# Patient Record
Sex: MALE | Race: WHITE | Employment: OTHER | URBAN - METROPOLITAN AREA
[De-identification: names, ages, dates, MRNs, and addresses within clinical notes are randomized per-mention and may not be internally consistent; named-entity substitution may affect disease eponyms.]

---

## 2017-03-29 ENCOUNTER — OP HISTORICAL/CONVERTED ENCOUNTER (OUTPATIENT)
Dept: OTHER | Age: 72
End: 2017-03-29

## 2017-08-03 ENCOUNTER — OP HISTORICAL/CONVERTED ENCOUNTER (OUTPATIENT)
Dept: OTHER | Age: 72
End: 2017-08-03

## 2017-08-22 ENCOUNTER — OP HISTORICAL/CONVERTED ENCOUNTER (OUTPATIENT)
Dept: OTHER | Age: 72
End: 2017-08-22

## 2017-09-07 ENCOUNTER — OP HISTORICAL/CONVERTED ENCOUNTER (OUTPATIENT)
Dept: OTHER | Age: 72
End: 2017-09-07

## 2017-09-18 ENCOUNTER — OP HISTORICAL/CONVERTED ENCOUNTER (OUTPATIENT)
Dept: OTHER | Age: 72
End: 2017-09-18

## 2018-01-17 ENCOUNTER — OP HISTORICAL/CONVERTED ENCOUNTER (OUTPATIENT)
Dept: OTHER | Age: 73
End: 2018-01-17

## 2018-03-15 ENCOUNTER — OP HISTORICAL/CONVERTED ENCOUNTER (OUTPATIENT)
Dept: OTHER | Age: 73
End: 2018-03-15

## 2018-04-09 ENCOUNTER — OP HISTORICAL/CONVERTED ENCOUNTER (OUTPATIENT)
Dept: OTHER | Age: 73
End: 2018-04-09

## 2018-08-02 ENCOUNTER — OP HISTORICAL/CONVERTED ENCOUNTER (OUTPATIENT)
Dept: OTHER | Age: 73
End: 2018-08-02

## 2018-08-10 ENCOUNTER — OP HISTORICAL/CONVERTED ENCOUNTER (OUTPATIENT)
Dept: OTHER | Age: 73
End: 2018-08-10

## 2018-10-05 ENCOUNTER — OP HISTORICAL/CONVERTED ENCOUNTER (OUTPATIENT)
Dept: OTHER | Age: 73
End: 2018-10-05

## 2019-01-22 ENCOUNTER — OP HISTORICAL/CONVERTED ENCOUNTER (OUTPATIENT)
Dept: OTHER | Age: 74
End: 2019-01-22

## 2019-08-06 ENCOUNTER — ED HISTORICAL/CONVERTED ENCOUNTER (OUTPATIENT)
Dept: OTHER | Age: 74
End: 2019-08-06

## 2019-08-28 ENCOUNTER — OP HISTORICAL/CONVERTED ENCOUNTER (OUTPATIENT)
Dept: OTHER | Age: 74
End: 2019-08-28

## 2019-12-30 ENCOUNTER — OP HISTORICAL/CONVERTED ENCOUNTER (OUTPATIENT)
Dept: OTHER | Age: 74
End: 2019-12-30

## 2020-01-14 ENCOUNTER — OP HISTORICAL/CONVERTED ENCOUNTER (OUTPATIENT)
Dept: OTHER | Age: 75
End: 2020-01-14

## 2020-06-04 ENCOUNTER — OP HISTORICAL/CONVERTED ENCOUNTER (OUTPATIENT)
Dept: OTHER | Age: 75
End: 2020-06-04

## 2020-09-29 ENCOUNTER — OFFICE VISIT (OUTPATIENT)
Dept: PODIATRY | Age: 75
End: 2020-09-29
Payer: MEDICARE

## 2020-09-29 VITALS
HEART RATE: 70 BPM | BODY MASS INDEX: 21.49 KG/M2 | WEIGHT: 141.8 LBS | OXYGEN SATURATION: 96 % | HEIGHT: 68 IN | DIASTOLIC BLOOD PRESSURE: 80 MMHG | SYSTOLIC BLOOD PRESSURE: 136 MMHG | TEMPERATURE: 98.4 F

## 2020-09-29 DIAGNOSIS — L84 CORN OF TOE: Primary | ICD-10-CM

## 2020-09-29 PROCEDURE — G8536 NO DOC ELDER MAL SCRN: HCPCS | Performed by: PODIATRIST

## 2020-09-29 PROCEDURE — G8427 DOCREV CUR MEDS BY ELIG CLIN: HCPCS | Performed by: PODIATRIST

## 2020-09-29 PROCEDURE — G8420 CALC BMI NORM PARAMETERS: HCPCS | Performed by: PODIATRIST

## 2020-09-29 PROCEDURE — 3017F COLORECTAL CA SCREEN DOC REV: CPT | Performed by: PODIATRIST

## 2020-09-29 PROCEDURE — 1101F PT FALLS ASSESS-DOCD LE1/YR: CPT | Performed by: PODIATRIST

## 2020-09-29 PROCEDURE — 99213 OFFICE O/P EST LOW 20 MIN: CPT | Performed by: PODIATRIST

## 2020-09-29 PROCEDURE — G8432 DEP SCR NOT DOC, RNG: HCPCS | Performed by: PODIATRIST

## 2020-09-29 RX ORDER — RAMIPRIL 10 MG/1
10 CAPSULE ORAL DAILY
COMMUNITY

## 2020-09-29 RX ORDER — PANTOPRAZOLE SODIUM 40 MG/1
40 TABLET, DELAYED RELEASE ORAL DAILY
COMMUNITY

## 2020-09-29 RX ORDER — AMLODIPINE BESYLATE 5 MG/1
5 TABLET ORAL DAILY
COMMUNITY

## 2020-09-29 RX ORDER — CLOPIDOGREL BISULFATE 75 MG/1
TABLET ORAL
COMMUNITY
End: 2022-03-16 | Stop reason: ALTCHOICE

## 2020-09-29 RX ORDER — FLUTICASONE FUROATE, UMECLIDINIUM BROMIDE AND VILANTEROL TRIFENATATE 100; 62.5; 25 UG/1; UG/1; UG/1
1 POWDER RESPIRATORY (INHALATION) DAILY
COMMUNITY

## 2020-09-29 RX ORDER — TAMSULOSIN HYDROCHLORIDE 0.4 MG/1
0.4 CAPSULE ORAL DAILY
COMMUNITY
End: 2021-03-16 | Stop reason: SDUPTHER

## 2020-09-29 RX ORDER — ATORVASTATIN CALCIUM 10 MG/1
TABLET, FILM COATED ORAL DAILY
COMMUNITY

## 2020-09-29 RX ORDER — ASPIRIN 81 MG/1
TABLET ORAL DAILY
COMMUNITY

## 2020-09-30 PROBLEM — L84 CORN OF TOE: Status: ACTIVE | Noted: 2020-09-30

## 2020-09-30 NOTE — PROGRESS NOTES
Podiatry History and Physical    Subjective:         Patient is a 77 y.o. male who is being seen as a returning patient complaining of pain to his right fifth toe. Patient states roughly 2 years prior he underwent surgical procedure there and now he is having a callus/corn buildup to the area. He denies any trauma to the area. He states the pain rest level of 2 out of 10. He states the pain is worse with his close toed shoes and ambulating. He denies any local/systemic signs infection. He denies any other pedal complaints    Past Medical History:   Diagnosis Date    Hypercholesterolemia     Hypertension     Stroke ([address]     mini-stroke     Past Surgical History:   Procedure Laterality Date    HX CORONARY STENT PLACEMENT         Family History   Problem Relation Age of Onset    No Known Problems Mother     No Known Problems Father       Social History     Tobacco Use    Smoking status: Current Some Day Smoker     Packs/day: 0.50     Years: 50.00     Pack years: 25.00    Smokeless tobacco: Never Used   Substance Use Topics    Alcohol use: Not on file     No Known Allergies  Prior to Admission medications    Medication Sig Start Date End Date Taking? Authorizing Provider   aspirin delayed-release 81 mg tablet Take  by mouth daily. Yes Provider, Historical   fluticasone-umeclidinium-vilanterol (Trelegy Ellipta) 100-62.5-25 mcg inhaler Take 1 Puff by inhalation daily. Yes Provider, Historical   pantoprazole (PROTONIX) 40 mg tablet Take 40 mg by mouth daily. Yes Provider, Historical   ramipriL (ALTACE) 10 mg capsule Take 10 mg by mouth daily. Yes Provider, Historical   amLODIPine (NORVASC) 5 mg tablet Take 5 mg by mouth daily. Yes Provider, Historical   tamsulosin (FLOMAX) 0.4 mg capsule Take 0.4 mg by mouth daily. Yes Provider, Historical   clopidogreL (Plavix) 75 mg tab Take  by mouth. Yes Provider, Historical   atorvastatin (LIPITOR) 10 mg tablet Take  by mouth daily.    Yes Provider, Historical       Review of Systems   Constitutional: Negative. Eyes: Negative. Respiratory: Negative. Endocrine: Negative. Genitourinary: Positive for frequency. Musculoskeletal: Negative. Allergic/Immunologic: Negative. Neurological: Negative. Hematological: Bruises/bleeds easily. Psychiatric/Behavioral: Negative. All other systems reviewed and are negative. Objective:     Visit Vitals  /80 (BP 1 Location: Left arm, BP Patient Position: Sitting)   Pulse 70   Temp 98.4 °F (36.9 °C) (Temporal)   Ht 5' 8\" (1.727 m)   Wt 141 lb 12.8 oz (64.3 kg)   SpO2 96%   BMI 21.56 kg/m²       Physical Exam  Vitals signs reviewed. Constitutional:       Appearance: He is normal weight. HENT:      Mouth/Throat:      Dentition: Normal dentition. Cardiovascular:      Pulses:           Dorsalis pedis pulses are 2+ on the right side and 2+ on the left side. Posterior tibial pulses are 2+ on the right side and 2+ on the left side. Pulmonary:      Effort: Pulmonary effort is normal.   Musculoskeletal:      Right lower leg: No edema. Left lower leg: No edema. Right foot: Normal range of motion. No deformity or bunion. Left foot: Normal range of motion. No deformity or bunion. Feet:      Right foot:      Protective Sensation: 10 sites tested. 10 sites sensed. Skin integrity: Callus present. Toenail Condition: Right toenails are normal.      Left foot:      Protective Sensation: 10 sites tested. 10 sites sensed. Skin integrity: Skin integrity normal.      Toenail Condition: Left toenails are normal.   Skin:     General: Skin is warm. Capillary Refill: Capillary refill takes 2 to 3 seconds. Neurological:      Mental Status: He is alert and oriented to person, place, and time. Psychiatric:         Mood and Affect: Mood and affect normal.         Behavior: Behavior is cooperative.          Data Review: No results found for this or any previous visit (from the past 24 hour(s)). Impression:       ICD-10-CM ICD-9-CM    1. Corn of toe  L84 700          Recommendation:     Patient seen and evaluated in the office  Discussed and educated patient regarding his current medical condition  Instructed patient to limit pressure and shear force to the area to prevent callus formation.   Symptoms return or persist, will interrogate the area with x-ray        RTC in

## 2020-11-02 ENCOUNTER — HOSPITAL ENCOUNTER (OUTPATIENT)
Dept: PREADMISSION TESTING | Age: 75
Discharge: HOME | End: 2020-11-02
Payer: MEDICARE

## 2020-11-02 PROCEDURE — 87635 SARS-COV-2 COVID-19 AMP PRB: CPT

## 2020-11-05 LAB — SARS-COV-2, COV2NT: NOT DETECTED

## 2020-11-06 ENCOUNTER — ANESTHESIA (OUTPATIENT)
Dept: ENDOSCOPY | Age: 75
End: 2020-11-06
Payer: MEDICARE

## 2020-11-06 ENCOUNTER — HOSPITAL ENCOUNTER (OUTPATIENT)
Age: 75
Setting detail: OUTPATIENT SURGERY
Discharge: HOME | End: 2020-11-06
Attending: INTERNAL MEDICINE | Admitting: INTERNAL MEDICINE
Payer: MEDICARE

## 2020-11-06 ENCOUNTER — ANESTHESIA EVENT (OUTPATIENT)
Dept: ENDOSCOPY | Age: 75
End: 2020-11-06
Payer: MEDICARE

## 2020-11-06 ENCOUNTER — APPOINTMENT (OUTPATIENT)
Dept: ENDOSCOPY | Age: 75
End: 2020-11-06
Attending: INTERNAL MEDICINE
Payer: MEDICARE

## 2020-11-06 VITALS
DIASTOLIC BLOOD PRESSURE: 69 MMHG | HEIGHT: 68 IN | WEIGHT: 143 LBS | SYSTOLIC BLOOD PRESSURE: 122 MMHG | TEMPERATURE: 97.6 F | BODY MASS INDEX: 21.67 KG/M2 | RESPIRATION RATE: 18 BRPM | OXYGEN SATURATION: 98 % | HEART RATE: 71 BPM

## 2020-11-06 PROCEDURE — 76040000007: Performed by: INTERNAL MEDICINE

## 2020-11-06 PROCEDURE — 77030019988 HC FCPS ENDOSC DISP BSC -B: Performed by: INTERNAL MEDICINE

## 2020-11-06 PROCEDURE — 2709999900 HC NON-CHARGEABLE SUPPLY: Performed by: INTERNAL MEDICINE

## 2020-11-06 PROCEDURE — 74011000250 HC RX REV CODE- 250: Performed by: NURSE ANESTHETIST, CERTIFIED REGISTERED

## 2020-11-06 PROCEDURE — 88305 TISSUE EXAM BY PATHOLOGIST: CPT

## 2020-11-06 PROCEDURE — 74011250636 HC RX REV CODE- 250/636: Performed by: NURSE ANESTHETIST, CERTIFIED REGISTERED

## 2020-11-06 PROCEDURE — 76060000032 HC ANESTHESIA 0.5 TO 1 HR: Performed by: INTERNAL MEDICINE

## 2020-11-06 PROCEDURE — 74011250636 HC RX REV CODE- 250/636: Performed by: INTERNAL MEDICINE

## 2020-11-06 RX ORDER — PROPOFOL 10 MG/ML
INJECTION, EMULSION INTRAVENOUS AS NEEDED
Status: DISCONTINUED | OUTPATIENT
Start: 2020-11-06 | End: 2020-11-06 | Stop reason: HOSPADM

## 2020-11-06 RX ORDER — LIDOCAINE HYDROCHLORIDE 20 MG/ML
INJECTION, SOLUTION EPIDURAL; INFILTRATION; INTRACAUDAL; PERINEURAL AS NEEDED
Status: DISCONTINUED | OUTPATIENT
Start: 2020-11-06 | End: 2020-11-06 | Stop reason: HOSPADM

## 2020-11-06 RX ORDER — SODIUM CHLORIDE, SODIUM LACTATE, POTASSIUM CHLORIDE, CALCIUM CHLORIDE 600; 310; 30; 20 MG/100ML; MG/100ML; MG/100ML; MG/100ML
50 INJECTION, SOLUTION INTRAVENOUS CONTINUOUS
Status: DISCONTINUED | OUTPATIENT
Start: 2020-11-06 | End: 2020-11-06 | Stop reason: HOSPADM

## 2020-11-06 RX ADMIN — LIDOCAINE HYDROCHLORIDE 50 MG: 20 INJECTION, SOLUTION EPIDURAL; INFILTRATION; INTRACAUDAL; PERINEURAL at 09:50

## 2020-11-06 RX ADMIN — SODIUM CHLORIDE, POTASSIUM CHLORIDE, SODIUM LACTATE AND CALCIUM CHLORIDE 50 ML/HR: 600; 310; 30; 20 INJECTION, SOLUTION INTRAVENOUS at 08:25

## 2020-11-06 RX ADMIN — LIDOCAINE HYDROCHLORIDE 100 MG: 20 INJECTION, SOLUTION EPIDURAL; INFILTRATION; INTRACAUDAL; PERINEURAL at 09:44

## 2020-11-06 RX ADMIN — PROPOFOL 100 MG: 10 INJECTION, EMULSION INTRAVENOUS at 09:44

## 2020-11-06 RX ADMIN — LIDOCAINE HYDROCHLORIDE 50 MG: 20 INJECTION, SOLUTION EPIDURAL; INFILTRATION; INTRACAUDAL; PERINEURAL at 09:47

## 2020-11-06 NOTE — ANESTHESIA POSTPROCEDURE EVALUATION
Procedure(s):  ESOPHAGOGASTRODUODENOSCOPY (EGD).     total IV anesthesia, general    Anesthesia Post Evaluation      Multimodal analgesia: multimodal analgesia not used between 6 hours prior to anesthesia start to PACU discharge  Patient participation: waiting for patient participation  Level of consciousness: sleepy but conscious  Pain management: adequate  Airway patency: patent  Anesthetic complications: no  Cardiovascular status: stable  Respiratory status: spontaneous ventilation  Hydration status: stable  Post anesthesia nausea and vomiting:  none  Final Post Anesthesia Temperature Assessment:  Normothermia (36.0-37.5 degrees C)      INITIAL Post-op Vital signs:   Vitals Value Taken Time   /62 11/6/2020  9:51 AM   Temp 36.5 °C (97.7 °F) 11/6/2020  9:51 AM   Pulse 83 11/6/2020  9:51 AM   Resp 10 11/6/2020  9:51 AM   SpO2 96 % 11/6/2020  9:51 AM

## 2020-11-06 NOTE — PERIOP NOTES
Patient alert and oriented x 4 with respirations even and unlabored on RA. Patient discharged per physician's order. Patient verbalizes understanding of discharge instructions. Patient transported via wheelchair to front hospital entrance with step-son present to transport patient via private vehicle.

## 2020-11-06 NOTE — ANESTHESIA PREPROCEDURE EVALUATION
Relevant Problems   No relevant active problems       Anesthetic History   No history of anesthetic complications            Review of Systems / Medical History  Patient summary reviewed, nursing notes reviewed and pertinent labs reviewed    Pulmonary    COPD: moderate      Shortness of breath         Neuro/Psych         TIA     Cardiovascular    Hypertension          CAD, cardiac stents and hyperlipidemia         GI/Hepatic/Renal     GERD (Jey's Esophagus): poorly controlled           Endo/Other  Within defined limits           Other Findings   Comments: Allergies  No Known Allergies  Ht: 5' 8\" (172.7 cm)  Weight: 64.9 kg (143 lb)  BMI: 21.74 kg/m²  Procedure  ESOPHAGOGASTRODUODENOSCOPY (EGD)    Medical History  Hypertension  Hypercholesterolemia  Stroke [facility]         Physical Exam    Airway  Mallampati: II  TM Distance: 4 - 6 cm  Neck ROM: normal range of motion   Mouth opening: Normal     Cardiovascular    Rhythm: regular  Rate: normal         Dental  No notable dental hx       Pulmonary  Breath sounds clear to auscultation               Abdominal  GI exam deferred       Other Findings            Anesthetic Plan    ASA: 3  Anesthesia type: total IV anesthesia and general          Induction: Intravenous  Anesthetic plan and risks discussed with: Patient      General anesthesia was prescribed for this patient because by definition it is \"a drug-induced loss of consciousness during which patients are not arousable, even by painful stimulation. \" Sometimes, the ability to independently maintain ventilatory function is often impaired and patients often require assistance in maintaining a patent airway. Occasionally, positive pressure ventilation may be required because of depressed spontaneous ventilation or drug-induced depression of neuromuscular function. This depth of anesthesia is preferred for endoscopic procedures to facilitate the procedure and for patient safety/quality of care.

## 2020-12-21 ENCOUNTER — HOSPITAL ENCOUNTER (OUTPATIENT)
Dept: CT IMAGING | Age: 75
Discharge: HOME | End: 2020-12-21
Attending: INTERNAL MEDICINE
Payer: MEDICARE

## 2020-12-21 DIAGNOSIS — J44.9 COPD (CHRONIC OBSTRUCTIVE PULMONARY DISEASE) (HCC): ICD-10-CM

## 2020-12-21 PROCEDURE — 71250 CT THORAX DX C-: CPT

## 2021-02-15 ENCOUNTER — TELEPHONE (OUTPATIENT)
Dept: UROLOGY | Age: 76
End: 2021-02-15

## 2021-02-15 NOTE — TELEPHONE ENCOUNTER
Patient states that he needs a refill on tamsulosin and sent to [facility] in [place].  I'm not sure if he has seen Dr. Stella Vasquez in a while I couldn't see it in his chart

## 2021-02-15 NOTE — TELEPHONE ENCOUNTER
PT has not been seen since 10/04/2019. Needs to make an appt to get refill. Let me know so I can get enough medication until he comes in.

## 2021-02-16 ENCOUNTER — TELEPHONE (OUTPATIENT)
Dept: UROLOGY | Age: 76
End: 2021-02-16

## 2021-02-16 RX ORDER — TAMSULOSIN HYDROCHLORIDE 0.4 MG/1
0.4 CAPSULE ORAL DAILY
Qty: 30 CAP | Refills: 1 | Status: SHIPPED | OUTPATIENT
Start: 2021-02-16 | End: 2021-03-18

## 2021-02-16 NOTE — TELEPHONE ENCOUNTER
Spoke with patient and let him know that Dr. Stern Scale sent over his refill for one month with 1 refill and that he needed to get a PSA done before his appt. I let him know the nurse would call him before letting him know he needs to get it and she would put an order in when it was time after she would call.

## 2021-02-16 NOTE — TELEPHONE ENCOUNTER
Pt states he went to the drug store and needed a refill on Tamsulosin they said they fax over the request for it and I let him know we haven't received it yet.  He made an appt for march with Dr. Janae Viera and said he only has 4 pills left

## 2021-03-15 ENCOUNTER — TELEPHONE (OUTPATIENT)
Dept: UROLOGY | Age: 76
End: 2021-03-15

## 2021-03-15 DIAGNOSIS — N40.0 ENLARGED PROSTATE: Primary | ICD-10-CM

## 2021-03-15 NOTE — TELEPHONE ENCOUNTER
Pt came into office to  lab order, very rude about wasteing his time over at [address] [place] [address] because they didn't have a order. Due to him not calling back and saying he was going to go get it drawn and asking us to put a order in.  Order was placed

## 2021-03-16 ENCOUNTER — OFFICE VISIT (OUTPATIENT)
Dept: UROLOGY | Age: 76
End: 2021-03-16
Payer: MEDICARE

## 2021-03-16 VITALS
HEART RATE: 74 BPM | OXYGEN SATURATION: 95 % | WEIGHT: 153 LBS | RESPIRATION RATE: 12 BRPM | SYSTOLIC BLOOD PRESSURE: 136 MMHG | TEMPERATURE: 96.5 F | HEIGHT: 67 IN | DIASTOLIC BLOOD PRESSURE: 70 MMHG | BODY MASS INDEX: 24.01 KG/M2

## 2021-03-16 DIAGNOSIS — Z12.5 SCREENING FOR PROSTATE CANCER: Primary | ICD-10-CM

## 2021-03-16 DIAGNOSIS — N40.0 ENLARGED PROSTATE: ICD-10-CM

## 2021-03-16 LAB
BILIRUB UR QL STRIP: NEGATIVE
GLUCOSE UR-MCNC: NEGATIVE MG/DL
KETONES P FAST UR STRIP-MCNC: NEGATIVE MG/DL
PH UR STRIP: 5 [PH] (ref 4.6–8)
PROT UR QL STRIP: NEGATIVE
PSA FREE MFR SERPL: 14 %
PSA FREE SERPL-MCNC: 0.07 NG/ML
PSA SERPL-MCNC: 0.5 NG/ML (ref 0–4)
SP GR UR STRIP: 1.02 (ref 1–1.03)
UA UROBILINOGEN AMB POC: NORMAL (ref 0.2–1)
URINALYSIS CLARITY POC: CLEAR
URINALYSIS COLOR POC: YELLOW
URINE BLOOD POC: NEGATIVE
URINE LEUKOCYTES POC: NEGATIVE
URINE NITRITES POC: NEGATIVE

## 2021-03-16 PROCEDURE — 1101F PT FALLS ASSESS-DOCD LE1/YR: CPT | Performed by: UROLOGY

## 2021-03-16 PROCEDURE — G8536 NO DOC ELDER MAL SCRN: HCPCS | Performed by: UROLOGY

## 2021-03-16 PROCEDURE — 99213 OFFICE O/P EST LOW 20 MIN: CPT | Performed by: UROLOGY

## 2021-03-16 PROCEDURE — 3017F COLORECTAL CA SCREEN DOC REV: CPT | Performed by: UROLOGY

## 2021-03-16 PROCEDURE — G8510 SCR DEP NEG, NO PLAN REQD: HCPCS | Performed by: UROLOGY

## 2021-03-16 PROCEDURE — 81003 URINALYSIS AUTO W/O SCOPE: CPT | Performed by: UROLOGY

## 2021-03-16 PROCEDURE — G8427 DOCREV CUR MEDS BY ELIG CLIN: HCPCS | Performed by: UROLOGY

## 2021-03-16 PROCEDURE — G8420 CALC BMI NORM PARAMETERS: HCPCS | Performed by: UROLOGY

## 2021-03-16 NOTE — PROGRESS NOTES
Chief Complaint   Patient presents with    Follow-up    Benign Prostatic Hypertrophy     1. Have you been to the ER, urgent care clinic since your last visit? Hospitalized since your last visit? No    2. Have you seen or consulted any other health care providers outside of the [address] [place] [address] since your last visit? Include any pap smears or colon screening.  No  Visit Vitals  /70 (BP 1 Location: Left upper arm, BP Patient Position: Sitting, BP Cuff Size: Adult)   Pulse 74   Temp (!) 96.5 °F (35.8 °C) (Temporal)   Resp 12   Ht 5' 7\" (1.702 m)   Wt 153 lb (69.4 kg)   SpO2 95%   BMI 23.96 kg/m²

## 2021-03-16 NOTE — LETTER
3/17/2021 Patient: Jazmin Pringle YOB: 1945 Date of Visit: 3/16/2021 America Campos MD 
[address] [place] [id number] Via Fax: [phone] Dear America Campos MD, Thank you for referring Mr. Bella Ortega to [place] [address] for evaluation. My notes for this consultation are attached. If you have questions, please do not hesitate to call me. I look forward to following your patient along with you. Sincerely, Rachel Arrieta MD

## 2021-03-17 NOTE — PROGRESS NOTES
HPI ROS PE NOTE          History of Present Illness   Chief complaint; annual follow-up checkup for BPH  Dhaval Garcia visit October 2019 is a 77 y.o. male who presents with follow-up visit for BPH, patient has a very satisfactory voiding pattern with international prostate symptom score administered by me of 2 total including nocturia once a night weak stream on occasion otherwise no significant voiding symptoms. His PSA test most recently performed March 16 of 2021 0.5 which is identical to his previous test of a year ago. Is pleased with his voiding pattern. He had earlier been treated for prostatitis and has had bacterial positive urine cultures which was treated a year ago but has had none more recently of this type. Has been taking tamsulosin in the past bladder neck obstruction is the only medicine for the urinary tract that is chronic. 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 Of his previous office notes did not indicate that he is not receiving testosterone replacement. . .    Past Medical History:   Diagnosis Date    BPH (benign prostatic hyperplasia)     Chronic prostatitis     Hypercholesterolemia     Hypertension     Impotence of organic origin     Increased urinary frequency     Microscopic hematuria     Nocturia     Stroke ([facility])     mini-stroke    Testicular hypofunction     Urinary obstruction       Past Surgical History:   Procedure Laterality Date    HX CORONARY STENT PLACEMENT      HX KNEE ARTHROSCOPY  1978    SC CARDIAC SURG PROCEDURE UNLIST       Family History   Problem Relation Age of Onset    No Known Problems Mother     Heart Disease Father       Social History     Tobacco Use    Smoking status: Current Some Day Smoker     Packs/day: 0.50     Years: 50.00     Pack years: 25.00    Smokeless tobacco: Never Used   Substance Use Topics    Alcohol use: Never     Frequency: Never       Prior to Admission medications    Medication Sig Start Date End Date Taking? Authorizing Provider   tamsulosin (FLOMAX) 0.4 mg capsule Take 1 Cap by mouth daily for 30 days. 2/16/21 3/18/21 Yes Sanchez Beth MD   aspirin delayed-release 81 mg tablet Take  by mouth daily. Yes Provider, Historical   fluticasone-umeclidinium-vilanterol (Trelegy Ellipta) 100-62.5-25 mcg inhaler Take 1 Puff by inhalation daily. Yes Provider, Historical   pantoprazole (PROTONIX) 40 mg tablet Take 40 mg by mouth daily. Yes Provider, Historical   ramipriL (ALTACE) 10 mg capsule Take 10 mg by mouth daily. Yes Provider, Historical   amLODIPine (NORVASC) 5 mg tablet Take 5 mg by mouth daily. Yes Provider, Historical   clopidogreL (Plavix) 75 mg tab Take  by mouth. Yes Provider, Historical   atorvastatin (LIPITOR) 10 mg tablet Take  by mouth daily. Yes Provider, Historical     No Known Allergies     Review of Systems:  Constitutional: negative  Respiratory: negative  Cardiovascular: negative  Gastrointestinal: positive for constipation  Genitourinary:positive for nocturia, decreased stream and Hypogonadism, erectile dysfunction  Hematologic/Lymphatic: negative  Neurological: History of TIAs     Physical Exam     Physical Exam:   Visit Vitals  /70 (BP 1 Location: Left upper arm, BP Patient Position: Sitting, BP Cuff Size: Adult)   Pulse 74   Temp (!) 96.5 °F (35.8 °C) (Temporal)   Resp 12   Ht 5' 7\" (1.702 m)   Wt 153 lb (69.4 kg)   SpO2 95%   BMI 23.96 kg/m²     General appearance: alert, cooperative, no distress, appears stated age  Head: Normocephalic, without obvious abnormality, atraumatic  Nose: Nares normal. Septum midline. Mucosa normal. No drainage or sinus tenderness. Back: symmetric, no curvature. ROM normal. No CVA tenderness. Lungs: clear to auscultation bilaterally  Chest wall: no tenderness  Heart: regular rate and rhythm, S1, S2 normal, no murmur, click, rub or gallop  Abdomen: soft, non-tender.  Bowel sounds normal. No masses,  no organomegaly  Male genitalia: abnormal findings: atrophic testes bilateral  Rectal: Prostate 28 g and benign mildly nodular, rest of GLADYS normal  Extremities: extremities normal, atraumatic, no cyanosis or edema  Pulses: 2+ and symmetric  Skin: Skin color, texture, turgor normal. No rashes or lesions  Neurologic: Grossly normal    Data Review:   Recent Results (from the past 48 hour(s))   AMB POC URINALYSIS DIP STICK AUTO W/O MICRO    Collection Time: 03/16/21  9:49 AM   Result Value Ref Range    Color (UA POC) Yellow     Clarity (UA POC) Clear     Glucose (UA POC) Negative Negative    Bilirubin (UA POC) Negative Negative    Ketones (UA POC) Negative Negative    Specific gravity (UA POC) 1.025 1.001 - 1.035    Blood (UA POC) Negative Negative    pH (UA POC) 5.0 4.6 - 8.0    Protein (UA POC) Negative Negative    Urobilinogen (UA POC) 0.2 mg/dL 0.2 - 1    Nitrites (UA POC) Negative Negative    Leukocyte esterase (UA POC) Negative Negative     No results for input(s): 48 in the last 72 hours. Assessment and Plan:   Stable BPH, continue treatment for bladder neck obstruction with tamsulosin  Erectile dysfunction, hypogonadism, testicular atrophy, no treatment is desired by the patient at this time      Mr. Lesley Reid has a reminder for a \"due or due soon\" health maintenance. I have asked that he contact his primary care provider for follow-up on this health maintenance. Juliet Michele M.D.  3/17/2021

## 2021-04-19 ENCOUNTER — TELEPHONE (OUTPATIENT)
Dept: UROLOGY | Age: 76
End: 2021-04-19

## 2021-04-19 RX ORDER — TAMSULOSIN HYDROCHLORIDE 0.4 MG/1
0.4 CAPSULE ORAL
Qty: 90 CAP | Refills: 3 | Status: SHIPPED | OUTPATIENT
Start: 2021-04-19

## 2021-07-30 ENCOUNTER — ANESTHESIA (OUTPATIENT)
Dept: ENDOSCOPY | Age: 76
End: 2021-07-30
Payer: MEDICARE

## 2021-07-30 ENCOUNTER — HOSPITAL ENCOUNTER (OUTPATIENT)
Age: 76
Setting detail: OUTPATIENT SURGERY
Discharge: HOME | End: 2021-07-30
Attending: INTERNAL MEDICINE | Admitting: INTERNAL MEDICINE
Payer: MEDICARE

## 2021-07-30 ENCOUNTER — ANESTHESIA EVENT (OUTPATIENT)
Dept: ENDOSCOPY | Age: 76
End: 2021-07-30
Payer: MEDICARE

## 2021-07-30 ENCOUNTER — APPOINTMENT (OUTPATIENT)
Dept: ENDOSCOPY | Age: 76
End: 2021-07-30
Attending: INTERNAL MEDICINE
Payer: MEDICARE

## 2021-07-30 VITALS
RESPIRATION RATE: 18 BRPM | HEIGHT: 65 IN | OXYGEN SATURATION: 95 % | SYSTOLIC BLOOD PRESSURE: 124 MMHG | TEMPERATURE: 97.7 F | DIASTOLIC BLOOD PRESSURE: 68 MMHG | HEART RATE: 35 BPM | BODY MASS INDEX: 26.33 KG/M2 | WEIGHT: 158 LBS

## 2021-07-30 PROCEDURE — 77030021593 HC FCPS BIOP ENDOSC BSC -A: Performed by: INTERNAL MEDICINE

## 2021-07-30 PROCEDURE — 76040000007: Performed by: INTERNAL MEDICINE

## 2021-07-30 PROCEDURE — 74011250636 HC RX REV CODE- 250/636: Performed by: INTERNAL MEDICINE

## 2021-07-30 PROCEDURE — 88305 TISSUE EXAM BY PATHOLOGIST: CPT

## 2021-07-30 PROCEDURE — 74011250636 HC RX REV CODE- 250/636: Performed by: ANESTHESIOLOGY

## 2021-07-30 PROCEDURE — 76060000032 HC ANESTHESIA 0.5 TO 1 HR: Performed by: INTERNAL MEDICINE

## 2021-07-30 PROCEDURE — 2709999900 HC NON-CHARGEABLE SUPPLY: Performed by: INTERNAL MEDICINE

## 2021-07-30 PROCEDURE — 77030013992 HC SNR POLYP ENDOSC BSC -B: Performed by: INTERNAL MEDICINE

## 2021-07-30 RX ORDER — PROPOFOL 10 MG/ML
INJECTION, EMULSION INTRAVENOUS AS NEEDED
Status: DISCONTINUED | OUTPATIENT
Start: 2021-07-30 | End: 2021-07-30 | Stop reason: HOSPADM

## 2021-07-30 RX ORDER — SODIUM CHLORIDE, SODIUM LACTATE, POTASSIUM CHLORIDE, CALCIUM CHLORIDE 600; 310; 30; 20 MG/100ML; MG/100ML; MG/100ML; MG/100ML
50 INJECTION, SOLUTION INTRAVENOUS CONTINUOUS
Status: DISCONTINUED | OUTPATIENT
Start: 2021-07-30 | End: 2021-08-02 | Stop reason: HOSPADM

## 2021-07-30 RX ORDER — SODIUM CHLORIDE, SODIUM LACTATE, POTASSIUM CHLORIDE, CALCIUM CHLORIDE 600; 310; 30; 20 MG/100ML; MG/100ML; MG/100ML; MG/100ML
INJECTION, SOLUTION INTRAVENOUS
Status: DISCONTINUED | OUTPATIENT
Start: 2021-07-30 | End: 2021-07-30 | Stop reason: HOSPADM

## 2021-07-30 RX ADMIN — PROPOFOL 100 MG: 10 INJECTION, EMULSION INTRAVENOUS at 09:47

## 2021-07-30 RX ADMIN — PROPOFOL 50 MG: 10 INJECTION, EMULSION INTRAVENOUS at 09:59

## 2021-07-30 RX ADMIN — SODIUM CHLORIDE, POTASSIUM CHLORIDE, SODIUM LACTATE AND CALCIUM CHLORIDE: 600; 310; 30; 20 INJECTION, SOLUTION INTRAVENOUS at 09:47

## 2021-07-30 RX ADMIN — PROPOFOL 50 MG: 10 INJECTION, EMULSION INTRAVENOUS at 09:54

## 2021-07-30 RX ADMIN — SODIUM CHLORIDE, POTASSIUM CHLORIDE, SODIUM LACTATE AND CALCIUM CHLORIDE 50 ML/HR: 600; 310; 30; 20 INJECTION, SOLUTION INTRAVENOUS at 09:03

## 2021-07-30 RX ADMIN — PROPOFOL 50 MG: 10 INJECTION, EMULSION INTRAVENOUS at 09:50

## 2021-07-30 NOTE — PROGRESS NOTES
Patient given discharge education verbally and gave back verbal understanding. Patient IV out and no signs of infection. Patient taken to ride's car via wheelchair to front hospital entrance.

## 2021-07-30 NOTE — ANESTHESIA POSTPROCEDURE EVALUATION
Procedure(s):  COLONOSCOPY.    total IV anesthesia, general    Anesthesia Post Evaluation      Multimodal analgesia: multimodal analgesia not used between 6 hours prior to anesthesia start to PACU discharge  Patient location during evaluation: bedside (Endoscopy suite)  Patient participation: complete - patient cannot participate  Level of consciousness: sleepy but conscious  Pain score: 0  Pain management: adequate  Airway patency: patent  Anesthetic complications: no  Cardiovascular status: acceptable  Respiratory status: acceptable and nasal cannula  Hydration status: acceptable  Comments: This patient remained on the stretcher. The patient was handed off to the endoscopy nursing team.  All questions regarding pre-, intra-, and postoperative care were answered.   Post anesthesia nausea and vomiting:  none      INITIAL Post-op Vital signs:   Vitals Value Taken Time   /71 07/30/21 1002   Temp 35.8 °C (96.4 °F) 07/30/21 1002   Pulse 73 07/30/21 1002   Resp 34 07/30/21 1002   SpO2 98 % 07/30/21 1002

## 2021-07-30 NOTE — ANESTHESIA PREPROCEDURE EVALUATION
Relevant Problems   No relevant active problems       Anesthetic History   No history of anesthetic complications            Review of Systems / Medical History  Patient summary reviewed, nursing notes reviewed and pertinent labs reviewed    Pulmonary    COPD: moderate      Shortness of breath         Neuro/Psych       CVA  TIA     Cardiovascular    Hypertension          CAD, cardiac stents and hyperlipidemia         GI/Hepatic/Renal     GERD (Jey's Esophagus): poorly controlled           Endo/Other  Within defined limits           Other Findings            Past Medical History:   Diagnosis Date    BPH (benign prostatic hyperplasia)     Chronic prostatitis     Hypercholesterolemia     Hypertension     Impotence of organic origin     Increased urinary frequency     Microscopic hematuria     Nocturia     [address]roke [address] [address]     mini-stroke    Testicular hypofunction     Urinary obstruction        Past Surgical History:   Procedure Laterality Date    HX CORONARY STENT PLACEMENT      HX KNEE ARTHROSCOPY  1978    [place] CARDIAC SURG PROCEDURE UNLIST         Current Outpatient Medications   Medication Instructions    amLODIPine (NORVASC) 5 mg, Oral, DAILY    aspirin delayed-release 81 mg tablet Oral, DAILY    atorvastatin (LIPITOR) 10 mg tablet Oral, DAILY    clopidogreL (Plavix) 75 mg tab Take  by mouth.     fluticasone-umeclidinium-vilanterol (Trelegy Ellipta) 100-62.5-25 mcg inhaler 1 Puff, Inhalation, DAILY    pantoprazole (PROTONIX) 40 mg, Oral, DAILY    ramipriL (ALTACE) 10 mg, Oral, DAILY    tamsulosin (FLOMAX) 0.4 mg, Oral, DAILY AFTER DINNER       Current Facility-Administered Medications   Medication Dose Route Frequency    lactated Ringers infusion  50 mL/hr IntraVENous CONTINUOUS       Patient Vitals for the past 24 hrs:   Temp Pulse Resp BP SpO2   07/30/21 0858 36.9 °C (98.4 °F) 76 20 139/73 95 %       No results found for: WBC, WBCLT, HGBPOC, HGB, HGBP, HCTPOC, HCT, PHCT, RBCH, PLT, MCV, HGBEXT, HCTEXT, PLTEXT  No results found for: NA, K, CL, CO2, AGAP, GLU, BUN, CREA, BUCR, GFRAA, GFRNA, CA, GFRAA  No results found for: APTT, PTP, INR, INREXT  No results found for: GLU, GLUCPOC  Physical Exam    Airway  Mallampati: II  TM Distance: 4 - 6 cm  Neck ROM: normal range of motion   Mouth opening: Normal     Cardiovascular    Rhythm: regular  Rate: normal         Dental    Dentition: Edentulous     Pulmonary  Breath sounds clear to auscultation               Abdominal  GI exam deferred       Other Findings            Anesthetic Plan    ASA: 3  Anesthesia type: total IV anesthesia and general          Induction: Intravenous  Anesthetic plan and risks discussed with: Patient      General anesthesia was prescribed for this patient because by definition it is \"a drug-induced loss of consciousness during which patients are not arousable, even by painful stimulation. \" Sometimes, the ability to independently maintain ventilatory function is often impaired and patients often require assistance in maintaining a patent airway. Occasionally, positive pressure ventilation may be required because of depressed spontaneous ventilation or drug-induced depression of neuromuscular function. This depth of anesthesia is preferred for endoscopic procedures to facilitate the procedure and for patient safety/quality of care.

## 2021-09-09 ENCOUNTER — HOSPITAL ENCOUNTER (OUTPATIENT)
Dept: NON INVASIVE DIAGNOSTICS | Age: 76
Discharge: HOME | End: 2021-09-09
Attending: INTERNAL MEDICINE
Payer: MEDICARE

## 2021-09-09 ENCOUNTER — TRANSCRIBE ORDER (OUTPATIENT)
Dept: SCHEDULING | Age: 76
End: 2021-09-09

## 2021-09-09 DIAGNOSIS — M79.89 RIGHT LEG SWELLING: Primary | ICD-10-CM

## 2021-09-09 DIAGNOSIS — M79.89 RIGHT LEG SWELLING: ICD-10-CM

## 2021-09-09 PROCEDURE — 93971 EXTREMITY STUDY: CPT

## 2022-03-10 LAB
PSA FREE MFR SERPL: 13.3 %
PSA FREE SERPL-MCNC: 0.08 NG/ML
PSA SERPL-MCNC: 0.6 NG/ML (ref 0–4)

## 2022-03-13 NOTE — PROGRESS NOTES
HISTORY OF PRESENT ILLNESS  Sergo Gonzalez is a 77 y.o. male is here for annual follow up appointment. The patient is on tamsulosin. He has a history of nocturia,ED. he denies fevers, chills, nausea, vomiting or weight loss or bone pain. No true dysuria no gross hematuria. When I scan his bladder though he had over 246 cc in the bladder. Relatively asymptomatic. I catheterized him to be sure and we drained over 300 cc. Told him we will switch him to Rapaflo and see if I have a better response and patient is bladder better. PSA  03/09/22=0.6  03/15/21=0.5  IPSS 11, the patient reports that voiding symptoms do not bother him. He has been on tamsulosin and takes it every night. IPSS  Score: 11  His PVR is 246   Galeano drained  300cc  Rx   Past Medical History:  PMHx (including negatives):  has a past medical history of BPH (benign prostatic hyperplasia), Chronic prostatitis, Hypercholesterolemia, Hypertension, Impotence of organic origin, Increased urinary frequency, Microscopic hematuria, Nocturia, Stroke [facility]), Testicular hypofunction, and Urinary obstruction. PSurgHx:  has a past surgical history that includes hx coronary stent placement; hx knee arthroscopy (1978); and pr cardiac surg procedure unlist.  PSocHx:  reports that he has been smoking. He has a 25.00 pack-year smoking history. He has never used smokeless tobacco. He reports that he does not drink alcohol and does not use drugs. Chronic Conditions Addressed Today     1. Chronic prostatitis     Relevant Medications     silodosin (RAPAFLO) 8 mg capsule     Other Relevant Orders     AMB POC PVR, SCOTT,POST-VOID RES,US,NON-IMAGING (Completed)     AMB POC URINALYSIS DIP STICK AUTO W/O MICRO (Completed)    2. Nocturia     Overview      As per Dr. Madalyn Hall note patient is very satisfied with his voiding pattern  including nocturia once a night weak stream on occasion otherwise no significant voiding symptoms. He is on tamsulosin         3.  Testicular hypofunction - Primary     Overview      As per Dr. 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 He is not receiving testosterone replacement         4. Smoker    5. Urinary retention     Relevant Medications     silodosin (RAPAFLO) 8 mg capsule      Acute Diagnoses Addressed Today     Erectile dysfunction, unspecified erectile dysfunction type              Review of Systems   Constitutional: Negative. HENT: Negative. Eyes: Negative. Respiratory: Negative. Cardiovascular: Negative. Gastrointestinal: Negative. Genitourinary: Negative. Musculoskeletal: Negative. Skin: Negative. Neurological: Negative. Endo/Heme/Allergies: Negative. Psychiatric/Behavioral: Negative. Patient denies the symptoms of COVID-19 per routine screening guidelines. Home Medications    Medication Sig Start Date End Date Taking? Authorizing Provider   silodosin (RAPAFLO) 8 mg capsule Take 1 Capsule by mouth daily (with breakfast). 3/16/22  Yes eMlissa Addison NP   tamsulosin (FLOMAX) 0.4 mg capsule Take 1 Cap by mouth daily (after dinner). 4/19/21  Yes Massimo Rucker MD   aspirin delayed-release 81 mg tablet Take  by mouth daily. Yes Provider, Historical   fluticasone-umeclidinium-vilanterol (Trelegy Ellipta) 100-62.5-25 mcg inhaler Take 1 Puff by inhalation daily. Yes Provider, Historical   pantoprazole (PROTONIX) 40 mg tablet Take 40 mg by mouth daily. Yes Provider, Historical   ramipriL (ALTACE) 10 mg capsule Take 10 mg by mouth daily. Yes Provider, Historical   amLODIPine (NORVASC) 5 mg tablet Take 5 mg by mouth daily. Yes Provider, Historical   atorvastatin (LIPITOR) 10 mg tablet Take  by mouth daily. Yes Provider, Historical      Physical Exam  Vitals and nursing note reviewed. Constitutional:       Appearance: Normal appearance. HENT:      Head: Normocephalic.       Nose: Nose normal. Mouth/Throat:      Mouth: Mucous membranes are moist.   Eyes:      Pupils: Pupils are equal, round, and reactive to light. Cardiovascular:      Rate and Rhythm: Normal rate and regular rhythm. Pulmonary:      Effort: Pulmonary effort is normal.   Abdominal:      General: Abdomen is flat. Palpations: Abdomen is soft. Genitourinary:     Penis: Normal.       Testes: Normal.      Prostate: Normal.   Musculoskeletal:         General: Normal range of motion. Cervical back: Normal range of motion and neck supple. Skin:     General: Skin is warm. Neurological:      General: No focal deficit present. Mental Status: He is alert and oriented to person, place, and time. Psychiatric:         Mood and Affect: Mood normal.         Behavior: Behavior normal.         Thought Content: Thought content normal.         Judgment: Judgment normal.         ASSESSMENT and PLAN  Diagnoses and all orders for this visit:    1. Testicular hypofunction    2. Chronic prostatitis  -     AMB POC PVR, SCOTT,POST-VOID RES,US,NON-IMAGING  -     AMB POC URINALYSIS DIP STICK AUTO W/O MICRO    3. Nocturia    4. Urinary retention    5. Smoker    6. Erectile dysfunction, unspecified erectile dysfunction type    Other orders  -     silodosin (RAPAFLO) 8 mg capsule; Take 1 Capsule by mouth daily (with breakfast). Alomaryana Clark may have a reminder for a \"due or due soon\" health maintenance. The patient has been encouraged to contact their primary care provider for follow-up on this health maintenance or other necessary and/or routine health screening.      Ayesha Biggs MD

## 2022-03-16 ENCOUNTER — OFFICE VISIT (OUTPATIENT)
Dept: UROLOGY | Age: 77
End: 2022-03-16
Payer: MEDICARE

## 2022-03-16 VITALS
HEART RATE: 79 BPM | OXYGEN SATURATION: 95 % | BODY MASS INDEX: 22.73 KG/M2 | RESPIRATION RATE: 16 BRPM | DIASTOLIC BLOOD PRESSURE: 78 MMHG | HEIGHT: 68 IN | WEIGHT: 150 LBS | SYSTOLIC BLOOD PRESSURE: 135 MMHG | TEMPERATURE: 98.2 F

## 2022-03-16 DIAGNOSIS — N41.1 CHRONIC PROSTATITIS: ICD-10-CM

## 2022-03-16 DIAGNOSIS — R33.9 URINARY RETENTION: ICD-10-CM

## 2022-03-16 DIAGNOSIS — Z12.5 SCREENING FOR PROSTATE CANCER: ICD-10-CM

## 2022-03-16 DIAGNOSIS — F17.200 SMOKER: ICD-10-CM

## 2022-03-16 DIAGNOSIS — R35.1 NOCTURIA: ICD-10-CM

## 2022-03-16 DIAGNOSIS — N52.9 ERECTILE DYSFUNCTION, UNSPECIFIED ERECTILE DYSFUNCTION TYPE: ICD-10-CM

## 2022-03-16 DIAGNOSIS — E29.1 TESTICULAR HYPOFUNCTION: Primary | ICD-10-CM

## 2022-03-16 LAB
BILIRUB UR QL: NEGATIVE
GLUCOSE UR-MCNC: NEGATIVE MG/DL
KETONES P FAST UR STRIP-MCNC: NEGATIVE MG/DL
PH UR STRIP: 5.5 [PH] (ref 4.6–8)
PROT UR QL STRIP: NEGATIVE
PVR POC: 262 CC
SP GR UR STRIP: 1.02 (ref 1–1.03)
UA UROBILINOGEN AMB POC: NORMAL (ref 0.2–1)
URINALYSIS CLARITY POC: CLEAR
URINALYSIS COLOR POC: YELLOW
URINE BLOOD POC: NEGATIVE
URINE LEUKOCYTES POC: NEGATIVE
URINE NITRITES POC: NEGATIVE

## 2022-03-16 PROCEDURE — 99214 OFFICE O/P EST MOD 30 MIN: CPT | Performed by: UROLOGY

## 2022-03-16 PROCEDURE — G8536 NO DOC ELDER MAL SCRN: HCPCS | Performed by: UROLOGY

## 2022-03-16 PROCEDURE — 51798 US URINE CAPACITY MEASURE: CPT | Performed by: UROLOGY

## 2022-03-16 PROCEDURE — G8427 DOCREV CUR MEDS BY ELIG CLIN: HCPCS | Performed by: UROLOGY

## 2022-03-16 PROCEDURE — G8510 SCR DEP NEG, NO PLAN REQD: HCPCS | Performed by: UROLOGY

## 2022-03-16 PROCEDURE — 81003 URINALYSIS AUTO W/O SCOPE: CPT | Performed by: UROLOGY

## 2022-03-16 PROCEDURE — G8420 CALC BMI NORM PARAMETERS: HCPCS | Performed by: UROLOGY

## 2022-03-16 PROCEDURE — 51701 INSERT BLADDER CATHETER: CPT | Performed by: UROLOGY

## 2022-03-16 RX ORDER — SILODOSIN 8 MG/1
8 CAPSULE ORAL
Qty: 90 CAPSULE | Refills: 2 | Status: SHIPPED | OUTPATIENT
Start: 2022-03-16

## 2022-03-16 NOTE — PROGRESS NOTES
Chief Complaint   Patient presents with    New Patient     ROS IPSS Forms    Benign Prostatic Hypertrophy    UPJ Obstruction     Bladder scan       PHQ-9 score is    Negative    Vitals:    03/16/22 1127   BP: 135/78   Pulse: 79   Resp: 16   Temp: 98.2 °F (36.8 °C)   TempSrc: Temporal   SpO2: 95%   Weight: 150 lb (68 kg)   Height: 5' 8\" (1.727 m)   PainSc:   0 - No pain      1. \"Have you been to the ER, urgent care clinic since your last visit? Hospitalized since your last visit? \" No    2. \"Have you seen or consulted any other health care providers outside of the [address] [place] [address] since your last visit? \" No     3. For patients aged 39-70: Has the patient had a colonoscopy / FIT/ Cologuard? No      If the patient is female:    4. For patients aged 41-77: Has the patient had a mammogram within the past 2 years? NA - based on age or sex      11. For patients aged 21-65: Has the patient had a pap smear?  NA - based on age or sex

## 2022-03-20 PROBLEM — L84 CORN OF TOE: Status: ACTIVE | Noted: 2020-09-30

## 2022-03-22 PROBLEM — F17.200 SMOKER: Status: ACTIVE | Noted: 2022-03-22

## 2022-03-22 PROBLEM — R33.9 URINARY RETENTION: Status: ACTIVE | Noted: 2022-03-22

## 2022-03-23 ENCOUNTER — TELEPHONE (OUTPATIENT)
Dept: UROLOGY | Age: 77
End: 2022-03-23

## 2022-03-23 PROBLEM — R39.14 BENIGN PROSTATIC HYPERPLASIA WITH INCOMPLETE BLADDER EMPTYING: Status: ACTIVE | Noted: 2022-03-23

## 2022-03-23 PROBLEM — N40.1 BENIGN PROSTATIC HYPERPLASIA WITH INCOMPLETE BLADDER EMPTYING: Status: ACTIVE | Noted: 2022-03-23

## 2022-03-23 PROBLEM — R39.14 FEELING OF INCOMPLETE BLADDER EMPTYING: Status: ACTIVE | Noted: 2022-03-23

## 2022-03-23 NOTE — TELEPHONE ENCOUNTER
Pt should stop taking medication, can you please ask alex or dr. Cleopatra Alba what she wants this pt to do?

## 2022-03-23 NOTE — ASSESSMENT & PLAN NOTE
Smoking tobacco irritates the bladder lining. That irritation can make voiding symptoms worse (incontinence, overactivity, and urinary frequency in particular). Additionally, there is an increased risk for the development of bladder cancer when you smoke. Erectile dysfunction is a result of poor blood flow to the penis. Smoking damages blood vessels which can impact blood flow and make erectile dysfunction worse. Patient was advised of this.

## 2022-03-23 NOTE — PATIENT INSTRUCTIONS
SMOKING AND BLADDER HEALTH:    Smoking tobacco irritates the bladder lining. That irritation can make voiding symptoms worse (incontinence, overactivity, and urinary frequency in particular). Additionally, there is an increased risk for the development of bladder cancer when you smoke. Erectile dysfunction is a result of poor blood flow to the penis. Smoking damages blood vessels which can impact blood flow and make erectile dysfunction worse.

## 2022-03-23 NOTE — PROGRESS NOTES
HISTORY OF PRESENT ILLNESS  Natahsa Soliman is a 77 y.o. male. Chief Complaint   Patient presents with    New Patient    Urinary Retention     Past Medical History:  PMHx (including negatives):  has a past medical history of BPH (benign prostatic hyperplasia), Chronic prostatitis, Hypercholesterolemia, Hypertension, Impotence of organic origin, Increased urinary frequency, Microscopic hematuria, Nocturia, Stroke [facility]), Testicular hypofunction, and Urinary obstruction. PSurgHx:  has a past surgical history that includes hx coronary stent placement; hx knee arthroscopy (1978); and pr cardiac surg procedure unlist.  PSocHx:  reports that he has been smoking. He has a 25.00 pack-year smoking history. He has never used smokeless tobacco. He reports that he does not drink alcohol and does not use drugs. Mr. Ashley Ridley is a Dr. Bianca Bridges patient. He was seen on 3/16/22 in clinic for an annual check up. Previously managed by Dr. Keanu Dawn. IPSS score on 3/16/22 of 11; not bothered by his voiding pattern. PSA this month was 0.6. He was found to have incomplete emptying vs retention with bladder scan volume of > 250cc with 300cc out on intermittent catheterization. He has been on tamsulosin many years. He was switched on the 16th to Rapaflo. On 3/22/23 he reported a \"reaction\" to the medication  saying he was straining to urinate to the point he felt he was \"going to pass out. \"   Dr. Enoch Lopez instructed the patient to stop the Rapaflo and resume tamsulosin and follow up with me today in clinic. His urinalysis today has signs of inflammation/infection. He reports straining to void and dysuria with a new feeling of urgency. His PVR today is less than prior scans at max volume of 148cc. We discussed treating for UTI/prostatitis. Patient would prefer to be without catheter. I think this is reasonable given that his previous scans show double the volume than today.   If symptoms do not improve on abx therapy he will return for re-evaluation and possible sheikh catheter placement. He is encouraged to hydrate well. Avoid all activities that could exacerbate prostatitis. He expressed understanding. Chronic Conditions Addressed Today     1. Prostatitis     Relevant Medications     doxycycline (ADOXA) 100 mg tablet    2. Increased urinary frequency     Overview      3/24/22: urgency and frequency, new onset with signs of inflammation in the urinalysis. Current Assessment & Plan      His urinalysis today has signs of inflammation/infection. He reports straining to void and dysuria with a new feeling of urgency. His PVR today is less than prior scans at max volume of 148cc. We discussed treating for UTI/prostatitis. If symptoms do not improve on abx therapy he will return for re-evaluation and possible sheikh catheter placement. He is encouraged to hydrate well. Avoid all activities that could exacerbate prostatitis. He expressed understanding. 3. Smoker     Overview      Current everyday smoker. Current Assessment & Plan      Smoking tobacco irritates the bladder lining. That irritation can make voiding symptoms worse (incontinence, overactivity, and urinary frequency in particular). Additionally, there is an increased risk for the development of bladder cancer when you smoke. Erectile dysfunction is a result of poor blood flow to the penis. Smoking damages blood vessels which can impact blood flow and make erectile dysfunction worse. Patient was advised of this. 4. Urinary retention     Overview      Retention vs incomplete emptying. Patient reports straining and difficulty with stream initiation. Has been relatively asymptomatic of this. No renal function labs for comparison/baseline. 3/16/22: 246 cc on bladder scan; straight cath for 300 cc. Switched from tamsulosin to Rapaflo.     3/23/22: pt reports straining to urinate and \"reaction\" to Rapaflo. Advised to switch back to tamsulosin and come in for further evaluation. Current Assessment & Plan      His urinalysis today has signs of inflammation/infection. He reports straining to void and dysuria with a new feeling of urgency. His PVR today is less than prior scans at max volume of 148cc. We discussed treating for UTI/prostatitis. Patient would prefer to be without catheter. I think this is reasonable given that his previous scans show double the volume than today. If symptoms do not improve on abx therapy he will return for re-evaluation and possible sheikh catheter placement. He is encouraged to hydrate well. Avoid all activities that could exacerbate prostatitis. He expressed understanding. Relevant Medications     doxycycline (ADOXA) 100 mg tablet     Other Relevant Orders     AMB POC URINALYSIS DIP STICK AUTO W/O MICRO     AMB POC PVR, SCOTT,POST-VOID RES,US,NON-IMAGING     CULTURE, URINE    5. Feeling of incomplete bladder emptying - Primary     Overview      Retention vs incomplete emptying. Patient reports straining and difficulty with stream initiation. Has been relatively asymptomatic of this. No renal function labs for comparison/baseline. 3/16/22: 246 cc on bladder scan; straight cath for 300 cc. Switched from tamsulosin to Rapaflo. 3/23/22: pt reports straining to urinate and \"reaction\" to Rapaflo. Advised to switch back to tamsulosin and come in for further evaluation. Current Assessment & Plan      His urinalysis today has signs of inflammation/infection. He reports straining to void and dysuria with a new feeling of urgency. His PVR today is less than prior scans at max volume of 148cc. We discussed treating for UTI/prostatitis. If symptoms do not improve on abx therapy he will return for re-evaluation and possible sheikh catheter placement. He is encouraged to hydrate well.   Avoid all activities that could exacerbate prostatitis. He expressed understanding. Relevant Medications     doxycycline (ADOXA) 100 mg tablet     Other Relevant Orders     AMB POC URINALYSIS DIP STICK AUTO W/O MICRO     AMB POC PVR, SCOTT,POST-VOID RES,US,NON-IMAGING     CULTURE, URINE    6. Benign prostatic hyperplasia with incomplete bladder emptying     Overview      BPH with incomplete emptying and nocturia. Has been on tamsulosin for years. Did not tolerate a trial of Rapaflo. PSA 3/9/22 was 0.6  3/16/22: bladder scan for > 250cc, 300cc out on intermittent catheterization. Current Assessment & Plan      His urinalysis today has signs of inflammation/infection. He reports straining to void and dysuria with a new feeling of urgency. His PVR today is less than prior scans at max volume of 148cc. We discussed treating for UTI/prostatitis. Patient would prefer to be without catheter. I think this is reasonable given that his previous scans show double the volume than today. If symptoms do not improve on abx therapy he will return for re-evaluation and possible sheikh catheter placement. He is encouraged to hydrate well. Avoid all activities that could exacerbate prostatitis. He expressed understanding. Continue tamsulosin; stop silodosin. 7. Pyuria     Overview      3/24/22: signs of inflammation/infection on UA. Current Assessment & Plan      Plan for culture/RX doxycycline x 14 days. Treat for prostatitis also. Relevant Medications     doxycycline (ADOXA) 100 mg tablet     Other Relevant Orders     CULTURE, URINE               Review of Systems   Constitutional: Negative for chills and fever. Genitourinary: Positive for dysuria, frequency and urgency. Negative for flank pain and hematuria. No suprapubic pain   Musculoskeletal: Negative for back pain. Patient denies the symptoms of COVID-19 per routine screening guidelines.     ASSESSMENT and PLAN  Diagnoses and all orders for this visit:    1. Feeling of incomplete bladder emptying  Assessment & Plan:  His urinalysis today has signs of inflammation/infection. He reports straining to void and dysuria with a new feeling of urgency. His PVR today is less than prior scans at max volume of 148cc. We discussed treating for UTI/prostatitis. If symptoms do not improve on abx therapy he will return for re-evaluation and possible sheikh catheter placement. He is encouraged to hydrate well. Avoid all activities that could exacerbate prostatitis. He expressed understanding. Orders:  -     AMB POC URINALYSIS DIP STICK AUTO W/O MICRO  -     AMB POC PVR, SCOTT,POST-VOID RES,US,NON-IMAGING  -     CULTURE, URINE  -     doxycycline (ADOXA) 100 mg tablet; Take 1 Tablet by mouth two (2) times a day for 14 days. 2. Pyuria  Assessment & Plan:  Plan for culture/RX doxycycline x 14 days. Treat for prostatitis also. Orders:  -     CULTURE, URINE  -     doxycycline (ADOXA) 100 mg tablet; Take 1 Tablet by mouth two (2) times a day for 14 days. 3. Increased urinary frequency  Assessment & Plan:  His urinalysis today has signs of inflammation/infection. He reports straining to void and dysuria with a new feeling of urgency. His PVR today is less than prior scans at max volume of 148cc. We discussed treating for UTI/prostatitis. If symptoms do not improve on abx therapy he will return for re-evaluation and possible sheikh catheter placement. He is encouraged to hydrate well. Avoid all activities that could exacerbate prostatitis. He expressed understanding. 4. Urinary retention  Assessment & Plan:  His urinalysis today has signs of inflammation/infection. He reports straining to void and dysuria with a new feeling of urgency. His PVR today is less than prior scans at max volume of 148cc. We discussed treating for UTI/prostatitis. Patient would prefer to be without catheter.   I think this is reasonable given that his previous scans show double the volume than today. If symptoms do not improve on abx therapy he will return for re-evaluation and possible sheikh catheter placement. He is encouraged to hydrate well. Avoid all activities that could exacerbate prostatitis. He expressed understanding. Orders:  -     AMB POC URINALYSIS DIP STICK AUTO W/O MICRO  -     AMB POC PVR, SCOTT,POST-VOID RES,US,NON-IMAGING  -     CULTURE, URINE  -     doxycycline (ADOXA) 100 mg tablet; Take 1 Tablet by mouth two (2) times a day for 14 days. 5. Prostatitis, unspecified prostatitis type  -     doxycycline (ADOXA) 100 mg tablet; Take 1 Tablet by mouth two (2) times a day for 14 days. 6. Benign prostatic hyperplasia with incomplete bladder emptying  Assessment & Plan:  His urinalysis today has signs of inflammation/infection. He reports straining to void and dysuria with a new feeling of urgency. His PVR today is less than prior scans at max volume of 148cc. We discussed treating for UTI/prostatitis. Patient would prefer to be without catheter. I think this is reasonable given that his previous scans show double the volume than today. If symptoms do not improve on abx therapy he will return for re-evaluation and possible sheikh catheter placement. He is encouraged to hydrate well. Avoid all activities that could exacerbate prostatitis. He expressed understanding. Continue tamsulosin; stop silodosin. 7. Smoker  Assessment & Plan:  Smoking tobacco irritates the bladder lining. That irritation can make voiding symptoms worse (incontinence, overactivity, and urinary frequency in particular). Additionally, there is an increased risk for the development of bladder cancer when you smoke. Erectile dysfunction is a result of poor blood flow to the penis. Smoking damages blood vessels which can impact blood flow and make erectile dysfunction worse. Patient was advised of this.        Lizabeth cloud have a reminder for a \"due or due soon\" health maintenance. The patient has been encouraged to contact their primary care provider for follow-up on this health maintenance or other necessary and/or routine health screening.      Nghia Matamoros NP

## 2022-03-23 NOTE — TELEPHONE ENCOUNTER
Pt states he is having reaction to RAPAFLO, says he is having to strain so hard to urinate and he is almost passing out from doing so  pls advise

## 2022-03-23 NOTE — TELEPHONE ENCOUNTER
Spoke to patient, ever since his visit, and taking rapaflow, the patient has to strain till nearly passing out to urinate. He was going better before we emptied his bladder and before the medication. I spoke to AJAY CAMPBELL, switch back to tamsulosin and come in on yelenas schedule tomorrow for retention. I scheduled.   Done

## 2022-03-24 ENCOUNTER — OFFICE VISIT (OUTPATIENT)
Dept: UROLOGY | Age: 77
End: 2022-03-24
Payer: MEDICARE

## 2022-03-24 VITALS
HEART RATE: 94 BPM | TEMPERATURE: 97.4 F | BODY MASS INDEX: 22.43 KG/M2 | SYSTOLIC BLOOD PRESSURE: 145 MMHG | OXYGEN SATURATION: 97 % | RESPIRATION RATE: 16 BRPM | WEIGHT: 148 LBS | DIASTOLIC BLOOD PRESSURE: 84 MMHG | HEIGHT: 68 IN

## 2022-03-24 DIAGNOSIS — R33.9 URINARY RETENTION: ICD-10-CM

## 2022-03-24 DIAGNOSIS — N40.1 BENIGN PROSTATIC HYPERPLASIA WITH INCOMPLETE BLADDER EMPTYING: ICD-10-CM

## 2022-03-24 DIAGNOSIS — R39.14 FEELING OF INCOMPLETE BLADDER EMPTYING: Primary | ICD-10-CM

## 2022-03-24 DIAGNOSIS — R82.81 PYURIA: ICD-10-CM

## 2022-03-24 DIAGNOSIS — R39.14 BENIGN PROSTATIC HYPERPLASIA WITH INCOMPLETE BLADDER EMPTYING: ICD-10-CM

## 2022-03-24 DIAGNOSIS — F17.200 SMOKER: ICD-10-CM

## 2022-03-24 DIAGNOSIS — N41.9 PROSTATITIS, UNSPECIFIED PROSTATITIS TYPE: ICD-10-CM

## 2022-03-24 DIAGNOSIS — R35.0 INCREASED URINARY FREQUENCY: ICD-10-CM

## 2022-03-24 LAB
BILIRUB UR QL: NEGATIVE
GLUCOSE UR-MCNC: NEGATIVE MG/DL
KETONES P FAST UR STRIP-MCNC: NEGATIVE MG/DL
PH UR STRIP: 7 [PH] (ref 4.6–8)
PROT UR QL STRIP: 300
PVR POC: NORMAL CC
SP GR UR STRIP: 1.02 (ref 1–1.03)
UA UROBILINOGEN AMB POC: NORMAL (ref 0.2–1)
URINALYSIS CLARITY POC: CLEAR
URINALYSIS COLOR POC: YELLOW
URINE BLOOD POC: NORMAL
URINE LEUKOCYTES POC: NORMAL
URINE NITRITES POC: NEGATIVE

## 2022-03-24 PROCEDURE — 51798 US URINE CAPACITY MEASURE: CPT | Performed by: NURSE PRACTITIONER

## 2022-03-24 PROCEDURE — 81003 URINALYSIS AUTO W/O SCOPE: CPT | Performed by: NURSE PRACTITIONER

## 2022-03-24 PROCEDURE — G8420 CALC BMI NORM PARAMETERS: HCPCS | Performed by: NURSE PRACTITIONER

## 2022-03-24 PROCEDURE — G8427 DOCREV CUR MEDS BY ELIG CLIN: HCPCS | Performed by: NURSE PRACTITIONER

## 2022-03-24 PROCEDURE — G8536 NO DOC ELDER MAL SCRN: HCPCS | Performed by: NURSE PRACTITIONER

## 2022-03-24 PROCEDURE — 99213 OFFICE O/P EST LOW 20 MIN: CPT | Performed by: NURSE PRACTITIONER

## 2022-03-24 PROCEDURE — G8432 DEP SCR NOT DOC, RNG: HCPCS | Performed by: NURSE PRACTITIONER

## 2022-03-24 RX ORDER — DOXYCYCLINE 100 MG/1
100 TABLET ORAL 2 TIMES DAILY
Qty: 28 TABLET | Refills: 0 | Status: SHIPPED | OUTPATIENT
Start: 2022-03-24 | End: 2022-04-07

## 2022-03-24 NOTE — ASSESSMENT & PLAN NOTE
His urinalysis today has signs of inflammation/infection. He reports straining to void and dysuria with a new feeling of urgency. His PVR today is less than prior scans at max volume of 148cc. We discussed treating for UTI/prostatitis. Patient would prefer to be without catheter. I think this is reasonable given that his previous scans show double the volume than today. If symptoms do not improve on abx therapy he will return for re-evaluation and possible sheikh catheter placement. He is encouraged to hydrate well. Avoid all activities that could exacerbate prostatitis. He expressed understanding.

## 2022-03-24 NOTE — ASSESSMENT & PLAN NOTE
His urinalysis today has signs of inflammation/infection. He reports straining to void and dysuria with a new feeling of urgency. His PVR today is less than prior scans at max volume of 148cc. We discussed treating for UTI/prostatitis. If symptoms do not improve on abx therapy he will return for re-evaluation and possible sheikh catheter placement. He is encouraged to hydrate well. Avoid all activities that could exacerbate prostatitis. He expressed understanding.

## 2022-03-24 NOTE — ASSESSMENT & PLAN NOTE
His urinalysis today has signs of inflammation/infection. He reports straining to void and dysuria with a new feeling of urgency. His PVR today is less than prior scans at max volume of 148cc. We discussed treating for UTI/prostatitis. Patient would prefer to be without catheter. I think this is reasonable given that his previous scans show double the volume than today. If symptoms do not improve on abx therapy he will return for re-evaluation and possible sheikh catheter placement. He is encouraged to hydrate well. Avoid all activities that could exacerbate prostatitis. He expressed understanding. Continue tamsulosin; stop silodosin.

## 2022-03-24 NOTE — PROGRESS NOTES
Chief Complaint   Patient presents with    New Patient    Urinary Retention       PHQ-9 score is    Negative    Vitals:    03/24/22 1016   BP: (!) 145/84   Pulse: 94   Resp: 16   Temp: 97.4 °F (36.3 °C)   TempSrc: Temporal   SpO2: 97%   Weight: 148 lb (67.1 kg)   Height: 5' 8\" (1.727 m)   PainSc:   0 - No pain      1. \"Have you been to the ER, urgent care clinic since your last visit? Hospitalized since your last visit? \" No    2. \"Have you seen or consulted any other health care providers outside of the [address] [place] [address] since your last visit? \" No     3. For patients aged 39-70: Has the patient had a colonoscopy / FIT/ Cologuard? Yes - no Care Gap present      If the patient is female:    4. For patients aged 41-77: Has the patient had a mammogram within the past 2 years? NA - based on age or sex      11. For patients aged 21-65: Has the patient had a pap smear?  NA - based on age or sex

## 2022-03-28 LAB — BACTERIA UR CULT: ABNORMAL

## 2022-03-28 NOTE — PROGRESS NOTES
Patient did have bacteria in the urine/bladder and should finish the course of abx that I prescribed. He should follow up if not improved with Dr. Becki Castro or Joe Silva.

## 2022-03-29 ENCOUNTER — TELEPHONE (OUTPATIENT)
Dept: UROLOGY | Age: 77
End: 2022-03-29

## 2022-03-29 NOTE — TELEPHONE ENCOUNTER
----- Message from Bart Martinez NP sent at 3/28/2022  9:08 AM EDT -----  Patient did have bacteria in the urine/bladder and should finish the course of abx that I prescribed. He should follow up if not improved with Dr. Tawanna Marroquin or Luis Mijares.

## 2022-04-01 ENCOUNTER — TELEPHONE (OUTPATIENT)
Dept: UROLOGY | Age: 77
End: 2022-04-01

## 2022-04-01 NOTE — TELEPHONE ENCOUNTER
----- Message from Belkis Burnham NP sent at 3/28/2022  9:08 AM EDT -----  Patient did have bacteria in the urine/bladder and should finish the course of abx that I prescribed. He should follow up if not improved with Dr. Tasha Malloy or Sharyle Santee.

## 2022-04-25 ENCOUNTER — TELEPHONE (OUTPATIENT)
Dept: UROLOGY | Age: 77
End: 2022-04-25

## 2022-04-25 NOTE — TELEPHONE ENCOUNTER
Patient says his pharmacy put in a refill request for Tamsulosin to be sent to [facility]  pls avise  Was prescribed undr Dr. Dede Butler but patient has been seen by Keshawn Diane already   - pt is out/took his last pill last night

## 2022-04-25 NOTE — TELEPHONE ENCOUNTER
Patient called back again wanting an update on his medication . I let him know that the provider is aware of his refill request and she has been in clinic all day but is aware of the message.  He said the drug store does not stay open up all night and he needs his medication today

## 2022-04-26 RX ORDER — TAMSULOSIN HYDROCHLORIDE 0.4 MG/1
0.4 CAPSULE ORAL DAILY
Qty: 90 CAPSULE | Refills: 3 | Status: SHIPPED | OUTPATIENT
Start: 2022-04-26

## 2022-05-15 NOTE — PROGRESS NOTES
HISTORY OF PRESENT ILLNESS  Tony Patient is a 69 y.o. male is here for follow up on his retention  Is relatively asymptomatic. He is not infected. He denies fevers chills flank pain nausea vomiting weight loss or bone pain. For now we will follow him if his volumes get larger then we may intervene  BPH with incomplete emptying and nocturia. Has been on tamsulosin for years. Did not tolerate a trial of Rapaflo, he had  to strain  With voiding. He is back on tamsulosin  3/16/22: bladder scan for > 250cc, 300cc out on intermittent catheterization. Today his PVR is  304cc, second PVR is 284 cc. He has no sypmtoms his urine is clean. His IPSS is 7 today. He is satisfied with his voiding pattern. PSA  03/09/22=0.6  03/15/21=0.5    PVR from the first void is 304cc, PVR from the second void is      Past Medical History:  PMHx (including negatives):  has a past medical history of BPH (benign prostatic hyperplasia), Chronic prostatitis, Hypercholesterolemia, Hypertension, Impotence of organic origin, Increased urinary frequency, Microscopic hematuria, Nocturia, Stroke [facility]), Testicular hypofunction, and Urinary obstruction. PSurgHx:  has a past surgical history that includes hx coronary stent placement; hx knee arthroscopy (1978); and pr cardiac surg procedure unlist.  PSocHx:  reports that he has been smoking. He has a 25.00 pack-year smoking history. He has never used smokeless tobacco. He reports that he does not drink alcohol and does not use drugs. Chronic Conditions Addressed Today     1. Increased urinary frequency     Overview      3/24/22: urgency and frequency, new onset with signs of inflammation in the urinalysis. 2. Testicular hypofunction     Overview      As per Dr. 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 He is not receiving testosterone replacement         3.  Smoker     Overview Current everyday smoker. 4. Urinary retention - Primary     Overview      Retention vs incomplete emptying. Patient reports straining and difficulty with stream initiation. Has been relatively asymptomatic of this. No renal function labs for comparison/baseline. 3/16/22: 246 cc on bladder scan; straight cath for 300 cc. Switched from tamsulosin to Rapaflo. 3/23/22: pt reports straining to urinate and \"reaction\" to Rapaflo. Advised to switch back to tamsulosin and come in for further evaluation. 5. Benign prostatic hyperplasia with incomplete bladder emptying     Overview      BPH with incomplete emptying and nocturia. Has been on tamsulosin for years. Did not tolerate a trial of Rapaflo. PSA 3/9/22 was 0.6  3/16/22: bladder scan for > 250cc, 300cc out on intermittent catheterization. Review of Systems   Constitutional: Negative. HENT: Negative. Eyes: Negative. Respiratory: Negative. Cardiovascular: Negative. Gastrointestinal: Negative. Genitourinary: Negative. Musculoskeletal: Negative. Skin: Negative. Neurological: Negative. Endo/Heme/Allergies: Negative. Psychiatric/Behavioral: Negative. Patient denies the symptoms of COVID-19 per routine screening guidelines. Home Medications    Medication Sig Start Date End Date Taking? Authorizing Provider   tamsulosin (FLOMAX) 0.4 mg capsule Take 1 Capsule by mouth daily. 4/26/22   Dusty Addison NP   silodosin (RAPAFLO) 8 mg capsule Take 1 Capsule by mouth daily (with breakfast). 3/16/22   Dusty Addison NP   tamsulosin (FLOMAX) 0.4 mg capsule Take 1 Cap by mouth daily (after dinner). 4/19/21   Maria Esther Faulkner MD   aspirin delayed-release 81 mg tablet Take  by mouth daily. Provider, Historical   fluticasone-umeclidinium-vilanterol (Trelegy Ellipta) 100-62.5-25 mcg inhaler Take 1 Puff by inhalation daily.     Provider, Historical   pantoprazole (PROTONIX) 40 mg tablet Take 40 mg by mouth daily. Provider, Historical   ramipriL (ALTACE) 10 mg capsule Take 10 mg by mouth daily. Provider, Historical   amLODIPine (NORVASC) 5 mg tablet Take 5 mg by mouth daily. Provider, Historical   atorvastatin (LIPITOR) 10 mg tablet Take  by mouth daily. Provider, Historical      Physical Exam  Vitals and nursing note reviewed. Constitutional:       Appearance: Normal appearance. HENT:      Head: Normocephalic. Nose: Nose normal.      Mouth/Throat:      Mouth: Mucous membranes are moist.   Eyes:      Pupils: Pupils are equal, round, and reactive to light. Cardiovascular:      Rate and Rhythm: Normal rate and regular rhythm. Pulmonary:      Effort: Pulmonary effort is normal.   Abdominal:      General: Abdomen is flat. Palpations: Abdomen is soft. Genitourinary:     Penis: Normal.       Testes: Normal.      Prostate: Normal.   Musculoskeletal:         General: Normal range of motion. Cervical back: Normal range of motion and neck supple. Skin:     General: Skin is warm. Neurological:      General: No focal deficit present. Mental Status: He is alert and oriented to person, place, and time. Psychiatric:         Mood and Affect: Mood normal.         Behavior: Behavior normal.         Thought Content: Thought content normal.         Judgment: Judgment normal.   ASSESSMENT and PLAN  Diagnoses and all orders for this visit:    1. Urinary retention    2. Smoker    3. Increased urinary frequency    4. Testicular hypofunction    5. Benign prostatic hyperplasia with incomplete bladder emptying         Working as well as his Rapaflo which he performed keep him on the see him back in 3 months to recheck his residual      Merissa Mcclelland may have a reminder for a \"due or due soon\" health maintenance. The patient has been encouraged to contact their primary care provider for follow-up on this health maintenance or other necessary and/or routine health screening.

## 2022-05-16 ENCOUNTER — TRANSCRIBE ORDER (OUTPATIENT)
Dept: SCHEDULING | Age: 77
End: 2022-05-16

## 2022-05-16 DIAGNOSIS — R91.1 LUNG NODULE: Primary | ICD-10-CM

## 2022-05-16 DIAGNOSIS — J44.9 COPD (CHRONIC OBSTRUCTIVE PULMONARY DISEASE) (HCC): ICD-10-CM

## 2022-05-17 ENCOUNTER — OFFICE VISIT (OUTPATIENT)
Dept: UROLOGY | Age: 77
End: 2022-05-17
Payer: MEDICARE

## 2022-05-17 VITALS
BODY MASS INDEX: 22.43 KG/M2 | OXYGEN SATURATION: 99 % | DIASTOLIC BLOOD PRESSURE: 75 MMHG | HEART RATE: 82 BPM | TEMPERATURE: 97.6 F | WEIGHT: 148 LBS | SYSTOLIC BLOOD PRESSURE: 128 MMHG | HEIGHT: 68 IN

## 2022-05-17 DIAGNOSIS — R35.0 INCREASED URINARY FREQUENCY: ICD-10-CM

## 2022-05-17 DIAGNOSIS — E29.1 TESTICULAR HYPOFUNCTION: ICD-10-CM

## 2022-05-17 DIAGNOSIS — N40.1 BENIGN PROSTATIC HYPERPLASIA WITH INCOMPLETE BLADDER EMPTYING: ICD-10-CM

## 2022-05-17 DIAGNOSIS — F17.200 SMOKER: ICD-10-CM

## 2022-05-17 DIAGNOSIS — R39.14 BENIGN PROSTATIC HYPERPLASIA WITH INCOMPLETE BLADDER EMPTYING: ICD-10-CM

## 2022-05-17 DIAGNOSIS — R33.9 URINARY RETENTION: Primary | ICD-10-CM

## 2022-05-17 LAB — PVR POC: NORMAL CC

## 2022-05-17 PROCEDURE — G8420 CALC BMI NORM PARAMETERS: HCPCS | Performed by: UROLOGY

## 2022-05-17 PROCEDURE — G8536 NO DOC ELDER MAL SCRN: HCPCS | Performed by: UROLOGY

## 2022-05-17 PROCEDURE — G8427 DOCREV CUR MEDS BY ELIG CLIN: HCPCS | Performed by: UROLOGY

## 2022-05-17 PROCEDURE — 99214 OFFICE O/P EST MOD 30 MIN: CPT | Performed by: UROLOGY

## 2022-05-17 PROCEDURE — 51798 US URINE CAPACITY MEASURE: CPT | Performed by: UROLOGY

## 2022-05-17 PROCEDURE — G8432 DEP SCR NOT DOC, RNG: HCPCS | Performed by: UROLOGY

## 2022-05-17 NOTE — PROGRESS NOTES
Chief Complaint   Patient presents with    Urinary Retention    Urinary Frequency    Other     pyuria    Follow-up       PHQ-9 score is    Negative    Vitals:    05/17/22 1359   BP: 128/75   Pulse: 82   Temp: 97.6 °F (36.4 °C)   TempSrc: Temporal   SpO2: 99%   Weight: 148 lb (67.1 kg)   Height: 5' 8\" (1.727 m)   PainSc:   0 - No pain      1. \"Have you been to the ER, urgent care clinic since your last visit? Hospitalized since your last visit? \" No    2. \"Have you seen or consulted any other health care providers outside of the [address] [place] [address] since your last visit? \" No     3. For patients aged 39-70: Has the patient had a colonoscopy / FIT/ Cologuard? No      If the patient is female:    4. For patients aged 41-77: Has the patient had a mammogram within the past 2 years? No      5. For patients aged 21-65: Has the patient had a pap smear?  No

## 2022-06-02 ENCOUNTER — HOSPITAL ENCOUNTER (OUTPATIENT)
Dept: CT IMAGING | Age: 77
Discharge: HOME | End: 2022-06-02
Attending: INTERNAL MEDICINE
Payer: MEDICARE

## 2022-06-02 DIAGNOSIS — J44.9 COPD (CHRONIC OBSTRUCTIVE PULMONARY DISEASE) (HCC): ICD-10-CM

## 2022-06-02 DIAGNOSIS — R91.1 LUNG NODULE: ICD-10-CM

## 2022-06-02 PROCEDURE — 71250 CT THORAX DX C-: CPT

## 2022-12-22 DIAGNOSIS — I10 ESSENTIAL (PRIMARY) HYPERTENSION: ICD-10-CM

## 2022-12-22 RX ORDER — PANTOPRAZOLE SODIUM 40 MG/1
TABLET, DELAYED RELEASE ORAL
Qty: 30 TABLET | Refills: 1 | Status: SHIPPED | OUTPATIENT
Start: 2022-12-22

## 2022-12-22 RX ORDER — RAMIPRIL 10 MG/1
CAPSULE ORAL
Qty: 180 CAPSULE | Refills: 1 | Status: SHIPPED | OUTPATIENT
Start: 2022-12-22

## 2023-05-17 RX ORDER — SILODOSIN 8 MG/1
8 CAPSULE ORAL
COMMUNITY
Start: 2022-03-16

## 2023-05-17 RX ORDER — PANTOPRAZOLE SODIUM 40 MG/1
1 TABLET, DELAYED RELEASE ORAL DAILY
COMMUNITY
Start: 2022-12-22

## 2023-05-17 RX ORDER — RAMIPRIL 10 MG/1
1 CAPSULE ORAL 2 TIMES DAILY
COMMUNITY
Start: 2022-12-22

## 2023-05-17 RX ORDER — FLUTICASONE FUROATE, UMECLIDINIUM BROMIDE AND VILANTEROL TRIFENATATE 100; 62.5; 25 UG/1; UG/1; UG/1
1 POWDER RESPIRATORY (INHALATION) DAILY
COMMUNITY

## 2023-05-17 RX ORDER — TAMSULOSIN HYDROCHLORIDE 0.4 MG/1
0.4 CAPSULE ORAL DAILY
COMMUNITY
Start: 2021-04-19

## 2023-05-17 RX ORDER — AMLODIPINE BESYLATE 5 MG/1
5 TABLET ORAL DAILY
COMMUNITY

## 2023-05-17 RX ORDER — ASPIRIN 81 MG/1
TABLET ORAL DAILY
COMMUNITY

## 2023-05-17 RX ORDER — ATORVASTATIN CALCIUM 10 MG/1
TABLET, FILM COATED ORAL DAILY
COMMUNITY

## (undated) DEVICE — REM POLYHESIVE ADULT PATIENT RETURN ELECTRODE: Brand: VALLEYLAB

## (undated) DEVICE — FCPS RAD JAW 4LC 240CM W/NDL -- BX/20 RADIAL JAW 4

## (undated) DEVICE — BLOCK BITE STD GRN ORAL AD W/O STRP SLD PLAS DISP BITEGARD

## (undated) DEVICE — TRAP SPEC POLYPR MULT CHMBR DISP

## (undated) DEVICE — THE ENDO CARRY-ON PROCEDURE KIT CONTAINS ALL OF THE SUPPLIES AND INFECTION PREVENTION PRODUCTS NEEDED FOR ENDOSCOPIC PROCEDURES: Brand: ENDO CARRY-ON PROCEDURE KIT

## (undated) DEVICE — SNARE POLYP MIC OVL 13X2.4MM -- CAPTIVATOR BX/10

## (undated) DEVICE — MASK ANES INF SZ 2 PREM TAIL VLV INFL PRT UNSCENTED SGL PT

## (undated) DEVICE — SINGLE-USE BIOPSY FORCEPS: Brand: RADIAL JAW 4